# Patient Record
Sex: FEMALE | Race: WHITE | NOT HISPANIC OR LATINO
[De-identification: names, ages, dates, MRNs, and addresses within clinical notes are randomized per-mention and may not be internally consistent; named-entity substitution may affect disease eponyms.]

---

## 2021-08-13 PROBLEM — Z00.00 ENCOUNTER FOR PREVENTIVE HEALTH EXAMINATION: Status: ACTIVE | Noted: 2021-08-13

## 2021-08-16 ENCOUNTER — APPOINTMENT (OUTPATIENT)
Dept: OTOLARYNGOLOGY | Facility: CLINIC | Age: 71
End: 2021-08-16
Payer: MEDICARE

## 2021-08-16 VITALS — BODY MASS INDEX: 22.18 KG/M2 | HEIGHT: 66 IN | WEIGHT: 138 LBS | TEMPERATURE: 97.2 F

## 2021-08-16 DIAGNOSIS — Z86.000 PERSONAL HISTORY OF IN-SITU NEOPLASM OF BREAST: ICD-10-CM

## 2021-08-16 DIAGNOSIS — Z85.3 PERSONAL HISTORY OF MALIGNANT NEOPLASM OF BREAST: ICD-10-CM

## 2021-08-16 DIAGNOSIS — H90.5 UNSPECIFIED SENSORINEURAL HEARING LOSS: ICD-10-CM

## 2021-08-16 DIAGNOSIS — Z87.891 PERSONAL HISTORY OF NICOTINE DEPENDENCE: ICD-10-CM

## 2021-08-16 PROCEDURE — 99204 OFFICE O/P NEW MOD 45 MIN: CPT

## 2021-08-16 PROCEDURE — 92557 COMPREHENSIVE HEARING TEST: CPT

## 2021-08-16 PROCEDURE — 92567 TYMPANOMETRY: CPT

## 2021-08-16 RX ORDER — LEVOTHYROXINE SODIUM 0.05 MG/1
50 TABLET ORAL
Qty: 90 | Refills: 0 | Status: ACTIVE | COMMUNITY
Start: 2021-08-01

## 2021-08-16 RX ORDER — LEVOTHYROXINE SODIUM 0.17 MG/1
TABLET ORAL
Refills: 0 | Status: ACTIVE | COMMUNITY

## 2021-08-16 NOTE — ASSESSMENT
[FreeTextEntry1] : The audiogram was ordered by me and interpreted by me today and the results are as follows- The hearing in her right ear is essentially normal. The majority of the frequencies in her left ear are also normal with a high frequency mild loss. She does have some asymmetry and thresholds. Her speech discrimination scores normal in her right ear an 84% and her left ear.\par \par In essence she has unilateral tinnitus with slight asymmetry.\par This does not appear to be fluctuating.\par I explained to her that while she may have eustachian tube dysfunction this is unrelated to this finding and the finding of tinnitus.\par She will be sent for an MRI to rule out a rectal lesion and be seen in followup.

## 2021-08-16 NOTE — CONSULT LETTER
[Dear  ___] : Dear  [unfilled], [Consult Letter:] : I had the pleasure of evaluating your patient, [unfilled]. [Please see my note below.] : Please see my note below. [Sincerely,] : Sincerely, [FreeTextEntry3] : Rex Dey MD\par [facility]- Co-Director\par [facility]  [FreeTextEntry1] : Enclosed- audiogram.

## 2021-08-16 NOTE — HISTORY OF PRESENT ILLNESS
[de-identified] : Initial visit, referred in consultation.\par Her chief complaint is "left ear cloggng and hissing-now it's more frequent".\par \par She reports that she was in her otherwise state of fine health when this problem developed several months ago.\par \par She was evaluated in allergy care. By history it sounds like eustachian tube dysfunction was thought to be involved.\par \par She reports a history of starting in her 20s having any ear problem. She does not recall that was her left side.\par \par At this point she is not complaining of fluctuating hearing loss, acutely perceived change in hearing, pain.

## 2021-09-10 ENCOUNTER — RESULT REVIEW (OUTPATIENT)
Age: 71
End: 2021-09-10

## 2021-09-10 ENCOUNTER — APPOINTMENT (OUTPATIENT)
Dept: MRI IMAGING | Facility: CLINIC | Age: 71
End: 2021-09-10
Payer: MEDICARE

## 2021-09-10 ENCOUNTER — OUTPATIENT (OUTPATIENT)
Dept: OUTPATIENT SERVICES | Facility: HOSPITAL | Age: 71
LOS: 1 days | End: 2021-09-10

## 2021-09-10 PROCEDURE — 70553 MRI BRAIN STEM W/O & W/DYE: CPT | Mod: 26,MH

## 2021-09-16 ENCOUNTER — TRANSCRIPTION ENCOUNTER (OUTPATIENT)
Age: 71
End: 2021-09-16

## 2022-10-20 ENCOUNTER — APPOINTMENT (OUTPATIENT)
Dept: OTOLARYNGOLOGY | Facility: CLINIC | Age: 72
End: 2022-10-20

## 2022-10-20 DIAGNOSIS — H93.12 TINNITUS, LEFT EAR: ICD-10-CM

## 2022-10-20 DIAGNOSIS — H90.3 SENSORINEURAL HEARING LOSS, BILATERAL: ICD-10-CM

## 2022-10-20 PROCEDURE — 92567 TYMPANOMETRY: CPT

## 2022-10-20 PROCEDURE — 99214 OFFICE O/P EST MOD 30 MIN: CPT

## 2022-10-20 PROCEDURE — 92557 COMPREHENSIVE HEARING TEST: CPT

## 2022-10-20 RX ORDER — FLUTICASONE PROPIONATE 50 MCG
SPRAY, SUSPENSION NASAL
Refills: 0 | Status: DISCONTINUED | COMMUNITY
End: 2022-10-20

## 2022-10-21 PROBLEM — H93.12 TINNITUS OF LEFT EAR: Status: ACTIVE | Noted: 2021-08-16

## 2022-10-21 NOTE — CONSULT LETTER
[Dear  ___] : Dear  [unfilled], [Courtesy Letter:] : I had the pleasure of seeing your patient, [unfilled], in my office today. [Please see my note below.] : Please see my note below. [Sincerely,] : Sincerely, [FreeTextEntry3] : Rex Dey MD\par [facility]- Co-Director\par [facility]

## 2022-10-21 NOTE — ASSESSMENT
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

## 2022-10-21 NOTE — HISTORY OF PRESENT ILLNESS
[de-identified] : Initial visit, referred in consultation.\par Her chief complaint is "left ear cloggng and hissing-now it's more frequent".\par \par She reports that she was in her otherwise state of fine health when this problem developed several months ago.\par \par She was evaluated in allergy care. By history it sounds like eustachian tube dysfunction was thought to be involved.\par \par She reports a history of starting in her 20s having any ear problem. She does not recall that was her left side.\par \par At this point she is not complaining of fluctuating hearing loss, acutely perceived change in hearing, pain. [FreeTextEntry1] : \par \par 10/20/2022 \par \par She reports that the tinnitus in her left ear continues but tends to be off and on.\par \par She is reporting a new complaint of what she describes as facial pulsing.  She is describing a pulsing sensation in her bilateral malar area and frontal area.\par This is associated she reports that in some instances with imbalance.

## 2022-10-25 PROBLEM — H90.3 SENSORINEURAL HEARING LOSS (SNHL) OF BOTH EARS: Status: ACTIVE | Noted: 2022-10-25
